# Patient Record
Sex: FEMALE | Race: WHITE
[De-identification: names, ages, dates, MRNs, and addresses within clinical notes are randomized per-mention and may not be internally consistent; named-entity substitution may affect disease eponyms.]

---

## 2021-06-05 ENCOUNTER — HOSPITAL ENCOUNTER (EMERGENCY)
Dept: HOSPITAL 47 - EC | Age: 41
Discharge: OTHER INSTITUTION | End: 2021-06-05
Payer: COMMERCIAL

## 2021-06-05 VITALS
TEMPERATURE: 98.3 F | SYSTOLIC BLOOD PRESSURE: 105 MMHG | DIASTOLIC BLOOD PRESSURE: 72 MMHG | RESPIRATION RATE: 18 BRPM | HEART RATE: 118 BPM
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
## 2021-06-05 DIAGNOSIS — J45.909: ICD-10-CM

## 2021-06-05 DIAGNOSIS — S61.411A: Primary | ICD-10-CM

## 2021-06-05 DIAGNOSIS — F17.200: ICD-10-CM

## 2021-06-05 PROCEDURE — 90715 TDAP VACCINE 7 YRS/> IM: CPT

## 2021-06-05 PROCEDURE — 99282 EMERGENCY DEPT VISIT SF MDM: CPT

## 2021-06-05 PROCEDURE — 96372 THER/PROPH/DIAG INJ SC/IM: CPT

## 2021-06-05 PROCEDURE — 90471 IMMUNIZATION ADMIN: CPT

## 2021-06-05 NOTE — ED
Wound/Laceration HPI





- General


Chief Complaint: Wound/Laceration


Stated Complaint: lac through hand


Source: patient


Mode of arrival: wheelchair


Limitations: no limitations





- History of Present Illness


Initial Comments: 


Janice is a previously healthy 41-year-old female who presents the emergency 

department today via private vehicle for evaluation of a laceration to her right

hand.  Patient reports she was using nonsteroidal kitchen knife to cut apart 2 

frozen burger patties when she slipped stabbing into her hand.  She immediately 

noted blood spraying out of her hand.  He came to the emergency department.  

Patient is not on any anticoagulant or antiplatelet medications.








- Related Data


                                    Allergies











Allergy/AdvReac Type Severity Reaction Status Date / Time


 


No Known Allergies Allergy   Verified 06/05/21 18:23














Review of Systems


ROS Statement: 


Those systems with pertinent positive or pertinent negative responses have been 

documented in the HPI.





ROS Other: All systems not noted in ROS Statement are negative.





Past Medical History


Past Medical History: Asthma


History of Any Multi-Drug Resistant Organisms: None Reported


Past Surgical History: No Surgical Hx Reported


Past Psychological History: Anxiety


Smoking Status: Current every day smoker


Past Alcohol Use History: Rare


Past Drug Use History: None Reported





General Exam





- General Exam Comments


Initial Comments: 


Physical Exam


GENERAL:


Patient is well-developed and well-nourished.  


Patient is nontoxic and well-hydrated and is in no distress.





HENT:


Normocephalic, Atraumatic. 





EYES:


PERRL, EOMI


No conjunctival pallor





PULMONARY:


Tachypneic and crying





CARDIOVASCULAR:


Tachycardic





ABDOMEN:


Non-distended





SKIN:


Approximately 1-1/2 cm laceration between the base of the first and second 

fingers on the palmar surface of the right hand, arterial bleeding from the 

laceration as noted





: 


Deferred





NEUROLOGIC:


Alert and oriented


Normal speech


Normal gait





MUSCULOSKELETAL:


Moving all extremities with no apparent injury 





PSYCHIATRIC:


No SI/HI





Limitations: no limitations





Course


                                   Vital Signs











  06/05/21 06/05/21





  18:20 18:38


 


Temperature 98.2 F 


 


Pulse Rate 166 H 122 H


 


Respiratory 20 16





Rate  


 


Blood Pressure 119/83 117/79


 


O2 Sat by Pulse 98 97





Oximetry  














Medical Decision Making





- Medical Decision Making


The patient was seen and evaluated immediately upon arrival to the emergency 

department, patient was noted to have a arterial injury to the palmar surface of

the right hand


Bleeding was controlled with direct pressure


Pressure dressing was applied while supplies were collected to repair the 

laceration





Patient's heart rate improved while resting in the trauma bay, she has no signs 

of significant blood loss, there is no hypotension or tachycardia after she has 

calmed down does not appear pale





Upon removal of the pressure dressing there is no active bleeding but with any 

movement of the hand patient again had pulsatile bleeding controlled with direct

pressure





Patient care was discussed with surgeon on call who recommended discussion of 

patient care with the trauma team


Patient care was discussed with Dr. Morataya at [facility] who is concerned 

no artery may need ligation and recommended transfer for evaluation by the 

trauma team or orthopedic surgery


The patient is agreeable to transfer








Disposition


Clinical Impression: 


 Laceration, Arterial hemorrhage





Disposition: OTHER INSTITUTION NOT DEFINED


Is patient prescribed a controlled substance at d/c from ED?: No


Referrals: 


Liliana Martinez MD [Primary Care Provider] - 1-2 days





- Out of Hospital Transfer - Req. Specs


Out of Hospital Transfer - Requested Specifics: Other Emergency Center ([facility])